# Patient Record
Sex: FEMALE | Race: WHITE | URBAN - METROPOLITAN AREA
[De-identification: names, ages, dates, MRNs, and addresses within clinical notes are randomized per-mention and may not be internally consistent; named-entity substitution may affect disease eponyms.]

---

## 2018-06-12 ENCOUNTER — OFFICE VISIT (OUTPATIENT)
Dept: OPHTHALMOLOGY | Facility: CLINIC | Age: 83
End: 2018-06-12
Payer: COMMERCIAL

## 2018-06-12 DIAGNOSIS — H43.813 POSTERIOR VITREOUS DETACHMENT OF BOTH EYES: ICD-10-CM

## 2018-06-12 DIAGNOSIS — H52.4 PRESBYOPIA: ICD-10-CM

## 2018-06-12 DIAGNOSIS — H25.813 COMBINED FORMS OF AGE-RELATED CATARACT OF BOTH EYES: Primary | ICD-10-CM

## 2018-06-12 PROCEDURE — 92014 COMPRE OPH EXAM EST PT 1/>: CPT | Performed by: OPHTHALMOLOGY

## 2018-06-12 PROCEDURE — 92015 DETERMINE REFRACTIVE STATE: CPT | Performed by: OPHTHALMOLOGY

## 2018-06-12 ASSESSMENT — CONF VISUAL FIELD
OS_NORMAL: 1
OD_NORMAL: 1

## 2018-06-12 ASSESSMENT — VISUAL ACUITY
OD_SC: 20/40
OD_CC: 2-
OS_CC: 2-
METHOD: SNELLEN - LINEAR
OS_SC: 20/40
OS_SC+: +2

## 2018-06-12 ASSESSMENT — REFRACTION_WEARINGRX
OS_SPHERE: +2.50
OD_CYLINDER: SPHERE
SPECS_TYPE: OTC
OD_SPHERE: +2.50
OS_CYLINDER: SPHERE

## 2018-06-12 ASSESSMENT — REFRACTION_MANIFEST
OD_AXIS: 012
OD_ADD: +3.00
OS_SPHERE: -0.50
OS_AXIS: 172
OS_ADD: +3.00
OD_SPHERE: -0.25
OS_CYLINDER: +0.75
OD_CYLINDER: +0.50

## 2018-06-12 ASSESSMENT — SLIT LAMP EXAM - LIDS: COMMENTS: 1-2+ DERMATOCHALASIS - UPPER LID

## 2018-06-12 ASSESSMENT — CUP TO DISC RATIO
OD_RATIO: 0.6
OS_RATIO: 0.4

## 2018-06-12 ASSESSMENT — EXTERNAL EXAM - RIGHT EYE: OD_EXAM: MILD BROW

## 2018-06-12 ASSESSMENT — TONOMETRY
IOP_METHOD: APPLANATION
OD_IOP_MMHG: 14
OS_IOP_MMHG: 14

## 2018-06-12 ASSESSMENT — EXTERNAL EXAM - LEFT EYE: OS_EXAM: MILD BROW

## 2018-06-12 NOTE — MR AVS SNAPSHOT
After Visit Summary   6/12/2018    Judy Talbert    MRN: [id number]           Patient Information     Date Of Birth          6/24/1930        Visit Information        Provider Department      6/12/2018 2:30 PM Zana Davis MD [facility]        Today's Diagnoses     Presbyopia    -  1    Combined forms of age-related cataract of both eyes        Posterior vitreous detachment of both eyes          Care Instructions    Glasses Rx given - optional.   Use artificial tears up to 4 times daily both eyes.  (Refresh Tears, Systane Ultra/Balance, or Theratears).  Call in February 2019 for an appointment in June 2019 for Complete Exam    Dr. Davis [phone]          Follow-ups after your visit        Who to contact     If you have questions or need follow up information about today's clinic visit or your schedule please contact [facility] directly at [phone].  Normal or non-critical lab and imaging results will be communicated to you by MyChart, letter or phone within 4 business days after the clinic has received the results. If you do not hear from us within 7 days, please contact the clinic through MyChart or phone. If you have a critical or abnormal lab result, we will notify you by phone as soon as possible.  Submit refill requests through [employer] or call your pharmacy and they will forward the refill request to us. Please allow 3 business days for your refill to be completed.          Additional Information About Your Visit        Care EveryWhere ID     This is your Care EveryWhere ID. This could be used by other organizations to access your [facility] medical records  [id number]         Blood Pressure from Last 3 Encounters:   No data found for BP    Weight from Last 3 Encounters:   No data found for Wt              Today, you had the following     No orders found for display       Primary Care Provider Office Phone # Fax #    Adithya Toscano MD  [phone]       [facility] [address]  [facility] [address]        Equal Access to Services     LIZZ NOLAN : Hadii christian hillman tanya Youssef, waangyda luqwai, olgata kadeepda sindhu, isha cabello. So [facility] [phone].    ATENCIÓN: Si habla español, tiene a lam disposición servicios gratuitos de asistencia lingüística. Llame al [phone].    We comply with applicable federal civil rights laws and [place] laws. We do not discriminate on the basis of race, color, national origin, age, disability, sex, sexual orientation, or gender identity.            Thank you!     Thank you for choosing [facility]  for your care. Our goal is always to provide you with excellent care. Hearing back from our patients is one way we can continue to improve our services. Please take a few minutes to complete the written survey that you may receive in the mail after your visit with us. Thank you!             Your Updated Medication List - Protect others around you: Learn how to safely use, store and throw away your medicines at www.disposemymeds.org.          This list is accurate as of 6/12/18  3:48 PM.  Always use your most recent med list.                   Brand Name Dispense Instructions for use Diagnosis    AMIODARONE HCL PO      Take 200 mg by mouth daily        ASPIRIN PO      Take 81 mg by mouth        calcium-vitamin D 600-400 MG-UNIT per tablet    CALTRATE     Take 1 tablet by mouth 2 times daily        MECLIZINE HCL PO           METOPROLOL SUCCINATE ER PO      Take 50 mg by mouth daily        WARFARIN SODIUM PO

## 2018-06-12 NOTE — PATIENT INSTRUCTIONS
Glasses Rx given - optional.   Use artificial tears up to 4 times daily both eyes.  (Refresh Tears, Systane Ultra/Balance, or Theratears).  Call in February 2019 for an appointment in June 2019 for Complete Exam    Dr. Davis [phone]

## 2018-06-12 NOTE — PROGRESS NOTES
Current Eye Medications:  None.       Subjective:  Comprehensive Eye Exam.  No vision changes or concerns - she wears over-the-counter readers as needed, but otherwise does well without correction.     Objective:  See Ophthalmology Exam.       Assessment:  Stable eye exam.      ICD-10-CM    1. Combined forms of age-related cataract, mild-mod, of both eyes H25.813 EYE EXAM (SIMPLE-NONBILLABLE)   2. Posterior vitreous detachment of both eyes H43.813    3. Presbyopia H52.4 REFRACTIVE STATUS        Plan:   Glasses Rx given - optional.   Use artificial tears up to 4 times daily both eyes.  (Refresh Tears, Systane Ultra/Balance, or Theratears).  Call in February 2019 for an appointment in June 2019 for Complete Exam    Dr. Davis [phone]

## 2018-06-17 PROBLEM — H25.813 COMBINED FORMS OF AGE-RELATED CATARACT OF BOTH EYES: Status: ACTIVE | Noted: 2018-06-17

## 2018-06-17 PROBLEM — H43.813 POSTERIOR VITREOUS DETACHMENT OF BOTH EYES: Status: ACTIVE | Noted: 2018-06-17

## 2019-06-24 ENCOUNTER — OFFICE VISIT (OUTPATIENT)
Dept: OPHTHALMOLOGY | Facility: CLINIC | Age: 84
End: 2019-06-24
Payer: COMMERCIAL

## 2019-06-24 DIAGNOSIS — H25.813 COMBINED FORMS OF AGE-RELATED CATARACT OF BOTH EYES: Primary | ICD-10-CM

## 2019-06-24 DIAGNOSIS — H43.813 POSTERIOR VITREOUS DETACHMENT OF BOTH EYES: ICD-10-CM

## 2019-06-24 PROCEDURE — 92014 COMPRE OPH EXAM EST PT 1/>: CPT | Performed by: OPHTHALMOLOGY

## 2019-06-24 ASSESSMENT — SLIT LAMP EXAM - LIDS: COMMENTS: 1-2+ DERMATOCHALASIS - UPPER LID

## 2019-06-24 ASSESSMENT — REFRACTION_WEARINGRX
OS_SPHERE: +2.50
SPECS_TYPE: OTC READERS
OD_SPHERE: +2.50
OD_CYLINDER: SPHERE
OS_CYLINDER: SPHERE

## 2019-06-24 ASSESSMENT — VISUAL ACUITY
OS_SC+: -1+1
OD_SC+: -2
OD_SC: 20/25
METHOD: SNELLEN - LINEAR
OS_SC: 20/30

## 2019-06-24 ASSESSMENT — REFRACTION_MANIFEST
OD_ADD: +3.00
OD_CYLINDER: +0.25
OS_CYLINDER: +1.00
OS_ADD: +3.00
OS_SPHERE: +0.25
OS_AXIS: 005
OD_AXIS: 010
OD_SPHERE: +0.25

## 2019-06-24 ASSESSMENT — TONOMETRY
OD_IOP_MMHG: 21
OS_IOP_MMHG: 21
IOP_METHOD: APPLANATION

## 2019-06-24 ASSESSMENT — CUP TO DISC RATIO
OS_RATIO: 0.4
OD_RATIO: 0.6

## 2019-06-24 ASSESSMENT — CONF VISUAL FIELD
OD_NORMAL: 1
OS_NORMAL: 1

## 2019-06-24 ASSESSMENT — EXTERNAL EXAM - LEFT EYE: OS_EXAM: MILD BROW

## 2019-06-24 ASSESSMENT — EXTERNAL EXAM - RIGHT EYE: OD_EXAM: MILD BROW

## 2019-06-24 NOTE — PROGRESS NOTES
Current Eye Medications:  None     Subjective: here for complete today. Only uses readers, vision has been very good. Has been dealing with Vertigo, has done physical therapy.  Also the therapist told her that she could tell she had low blood pressure that day, about a week an a half ago.  Seems unsteady today   Today [birth year] her birthday!  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      Objective:  See Ophthalmology Exam.       Assessment:  Stable mild-moderate cataract both eyes.      ICD-10-CM    1. Combined forms of age-related cataract, mild-mod, of both eyes H25.813 EYE EXAM (SIMPLE-NONBILLABLE)   2. Posterior vitreous detachment of both eyes H43.813 CANCELED: REFRACTIVE STATUS        Plan:  Continue over the counter reader (+3.00)  Call in February 2020 for an appointment in June 2020 for Complete Exam.    Dr. Davis [phone]

## 2019-06-24 NOTE — LETTER
6/24/2019         RE: Judy Talbert  [address] [id number]        Dear Colleague,    Thank you for referring your patient, Judy Talbert, to the [facility]. Please see a copy of my visit note below.     Current Eye Medications:  None     Subjective: here for complete today. Only uses readers, vision has been very good. Has been dealing with Vertigo, has done physical therapy.  Also the therapist told her that she could tell she had low blood pressure that day, about a week an a half ago.  Seems unsteady today   Today is her birthday!  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      Objective:  See Ophthalmology Exam.       Assessment:  Stable mild-moderate cataract both eyes.      ICD-10-CM    1. Combined forms of age-related cataract, mild-mod, of both eyes H25.813 EYE EXAM (SIMPLE-NONBILLABLE)   2. Posterior vitreous detachment of both eyes H43.813 CANCELED: REFRACTIVE STATUS        Plan:  Continue over the counter reader (+3.00)  Call in February 2020 for an appointment in June 2020 for Complete Exam.    Dr. Davis [phone]       Again, thank you for allowing me to participate in the care of your patient.        Sincerely,        Zana Davis MD

## 2019-06-24 NOTE — PATIENT INSTRUCTIONS
Continue over the counter reader (+3.00)  Call in February 2020 for an appointment in June 2020 for Complete Exam.    Dr. Davis [phone]

## 2022-01-01 ENCOUNTER — LAB REQUISITION (OUTPATIENT)
Dept: LAB | Facility: CLINIC | Age: 87
End: 2022-01-01
Payer: COMMERCIAL

## 2022-01-01 DIAGNOSIS — J18.9 PNEUMONIA, UNSPECIFIED ORGANISM: ICD-10-CM

## 2022-01-01 DIAGNOSIS — I48.0 PAROXYSMAL ATRIAL FIBRILLATION (H): ICD-10-CM

## 2022-01-01 DIAGNOSIS — D64.9 ANEMIA, UNSPECIFIED: ICD-10-CM

## 2022-01-01 DIAGNOSIS — Z51.81 ENCOUNTER FOR THERAPEUTIC DRUG LEVEL MONITORING: ICD-10-CM

## 2022-01-01 DIAGNOSIS — I48.91 UNSPECIFIED ATRIAL FIBRILLATION (H): ICD-10-CM

## 2022-01-01 DIAGNOSIS — D69.6 THROMBOCYTOPENIA, UNSPECIFIED (H): ICD-10-CM

## 2022-01-01 DIAGNOSIS — I82.401 ACUTE EMBOLISM AND THROMBOSIS OF UNSPECIFIED DEEP VEINS OF RIGHT LOWER EXTREMITY (H): ICD-10-CM

## 2022-01-01 DIAGNOSIS — D72.829 ELEVATED WHITE BLOOD CELL COUNT, UNSPECIFIED: ICD-10-CM

## 2022-01-01 LAB
AST SERPL W P-5'-P-CCNC: 29 U/L (ref 10–35)
BASOPHILS # BLD AUTO: 0 10E3/UL (ref 0–0.2)
BASOPHILS # BLD AUTO: 0 10E3/UL (ref 0–0.2)
BASOPHILS # BLD AUTO: 0.1 10E3/UL (ref 0–0.2)
BASOPHILS NFR BLD AUTO: 0 %
BASOPHILS NFR BLD AUTO: 1 %
BASOPHILS NFR BLD AUTO: 1 %
EOSINOPHIL # BLD AUTO: 0 10E3/UL (ref 0–0.7)
EOSINOPHIL # BLD AUTO: 0.1 10E3/UL (ref 0–0.7)
EOSINOPHIL # BLD AUTO: 0.1 10E3/UL (ref 0–0.7)
EOSINOPHIL NFR BLD AUTO: 1 %
EOSINOPHIL NFR BLD AUTO: 1 %
EOSINOPHIL NFR BLD AUTO: 2 %
ERYTHROCYTE [DISTWIDTH] IN BLOOD BY AUTOMATED COUNT: 15.9 % (ref 10–15)
ERYTHROCYTE [DISTWIDTH] IN BLOOD BY AUTOMATED COUNT: 16 % (ref 10–15)
ERYTHROCYTE [DISTWIDTH] IN BLOOD BY AUTOMATED COUNT: 17 % (ref 10–15)
ERYTHROCYTE [DISTWIDTH] IN BLOOD BY AUTOMATED COUNT: 17.6 % (ref 10–15)
HCT VFR BLD AUTO: 29.8 % (ref 35–47)
HCT VFR BLD AUTO: 31.5 % (ref 35–47)
HCT VFR BLD AUTO: 31.7 % (ref 35–47)
HCT VFR BLD AUTO: 32.7 % (ref 35–47)
HGB BLD-MCNC: 10 G/DL (ref 11.7–15.7)
HGB BLD-MCNC: 10 G/DL (ref 11.7–15.7)
HGB BLD-MCNC: 10.3 G/DL (ref 11.7–15.7)
HGB BLD-MCNC: 9.4 G/DL (ref 11.7–15.7)
IMM GRANULOCYTES # BLD: 0 10E3/UL
IMM GRANULOCYTES # BLD: 0.1 10E3/UL
IMM GRANULOCYTES # BLD: 0.1 10E3/UL
IMM GRANULOCYTES NFR BLD: 1 %
INR PPP: 1.28 (ref 0.85–1.15)
INR PPP: 1.4 (ref 0.85–1.15)
INR PPP: 1.41 (ref 0.85–1.15)
INR PPP: 1.53 (ref 0.85–1.15)
INR PPP: 2.13 (ref 0.85–1.15)
INR PPP: 2.27 (ref 0.85–1.15)
INR PPP: 2.34 (ref 0.85–1.15)
INR PPP: 2.43 (ref 0.85–1.15)
INR PPP: 2.49 (ref 0.85–1.15)
INR PPP: 2.56 (ref 0.85–1.15)
INR PPP: 2.67 (ref 0.85–1.15)
INR PPP: 2.69 (ref 0.85–1.15)
INR PPP: 2.7 (ref 0.85–1.15)
INR PPP: 2.83 (ref 0.85–1.15)
INR PPP: 2.88 (ref 0.85–1.15)
INR PPP: 2.89 (ref 0.85–1.15)
INR PPP: 3.02 (ref 0.85–1.15)
INR PPP: 3.03 (ref 0.85–1.15)
INR PPP: 3.04 (ref 0.85–1.15)
INR PPP: 3.26 (ref 0.85–1.15)
INR PPP: 3.41 (ref 0.85–1.15)
INR PPP: 3.86 (ref 0.85–1.15)
INR PPP: 3.88 (ref 0.85–1.15)
INR PPP: 3.92 (ref 0.85–1.15)
INR PPP: 7.36 (ref 0.85–1.15)
LYMPHOCYTES # BLD AUTO: 1.2 10E3/UL (ref 0.8–5.3)
LYMPHOCYTES # BLD AUTO: 1.3 10E3/UL (ref 0.8–5.3)
LYMPHOCYTES # BLD AUTO: 1.6 10E3/UL (ref 0.8–5.3)
LYMPHOCYTES NFR BLD AUTO: 21 %
LYMPHOCYTES NFR BLD AUTO: 27 %
LYMPHOCYTES NFR BLD AUTO: 29 %
MCH RBC QN AUTO: 30.4 PG (ref 26.5–33)
MCH RBC QN AUTO: 30.7 PG (ref 26.5–33)
MCH RBC QN AUTO: 30.8 PG (ref 26.5–33)
MCH RBC QN AUTO: 31 PG (ref 26.5–33)
MCHC RBC AUTO-ENTMCNC: 31.5 G/DL (ref 31.5–36.5)
MCHC RBC AUTO-ENTMCNC: 31.7 G/DL (ref 31.5–36.5)
MCV RBC AUTO: 96 FL (ref 78–100)
MCV RBC AUTO: 98 FL (ref 78–100)
MONOCYTES # BLD AUTO: 0.8 10E3/UL (ref 0–1.3)
MONOCYTES # BLD AUTO: 0.9 10E3/UL (ref 0–1.3)
MONOCYTES # BLD AUTO: 1 10E3/UL (ref 0–1.3)
MONOCYTES NFR BLD AUTO: 16 %
MONOCYTES NFR BLD AUTO: 18 %
MONOCYTES NFR BLD AUTO: 18 %
NEUTROPHILS # BLD AUTO: 2.6 10E3/UL (ref 1.6–8.3)
NEUTROPHILS # BLD AUTO: 2.9 10E3/UL (ref 1.6–8.3)
NEUTROPHILS # BLD AUTO: 3.3 10E3/UL (ref 1.6–8.3)
NEUTROPHILS NFR BLD AUTO: 51 %
NEUTROPHILS NFR BLD AUTO: 53 %
NEUTROPHILS NFR BLD AUTO: 58 %
NRBC # BLD AUTO: 0 10E3/UL
NRBC BLD AUTO-RTO: 0 /100
PLATELET # BLD AUTO: 106 10E3/UL (ref 150–450)
PLATELET # BLD AUTO: 117 10E3/UL (ref 150–450)
PLATELET # BLD AUTO: 120 10E3/UL (ref 150–450)
PLATELET # BLD AUTO: 92 10E3/UL (ref 150–450)
PLATELET # BLD AUTO: 96 10E3/UL (ref 150–450)
RBC # BLD AUTO: 3.09 10E6/UL (ref 3.8–5.2)
RBC # BLD AUTO: 3.23 10E6/UL (ref 3.8–5.2)
RBC # BLD AUTO: 3.25 10E6/UL (ref 3.8–5.2)
RBC # BLD AUTO: 3.35 10E6/UL (ref 3.8–5.2)
T4 FREE SERPL-MCNC: 1.62 NG/DL (ref 0.9–1.7)
TSH SERPL DL<=0.005 MIU/L-ACNC: 4.91 UIU/ML (ref 0.3–4.2)
WBC # BLD AUTO: 11.9 10E3/UL (ref 4–11)
WBC # BLD AUTO: 4.8 10E3/UL (ref 4–11)
WBC # BLD AUTO: 5.6 10E3/UL (ref 4–11)
WBC # BLD AUTO: 5.6 10E3/UL (ref 4–11)
WBC # BLD AUTO: 5.7 10E3/UL (ref 4–11)

## 2022-01-01 PROCEDURE — 36415 COLL VENOUS BLD VENIPUNCTURE: CPT | Mod: ORL | Performed by: NURSE PRACTITIONER

## 2022-01-01 PROCEDURE — P9604 ONE-WAY ALLOW PRORATED TRIP: HCPCS | Mod: ORL | Performed by: NURSE PRACTITIONER

## 2022-01-01 PROCEDURE — 85610 PROTHROMBIN TIME: CPT | Mod: ORL | Performed by: NURSE PRACTITIONER

## 2022-01-01 PROCEDURE — P9603 ONE-WAY ALLOW PRORATED MILES: HCPCS | Mod: ORL | Performed by: NURSE PRACTITIONER

## 2022-01-01 PROCEDURE — 85048 AUTOMATED LEUKOCYTE COUNT: CPT | Mod: ORL | Performed by: NURSE PRACTITIONER

## 2022-01-01 PROCEDURE — 85025 COMPLETE CBC W/AUTO DIFF WBC: CPT | Mod: ORL

## 2022-01-01 PROCEDURE — 84450 TRANSFERASE (AST) (SGOT): CPT | Mod: ORL | Performed by: NURSE PRACTITIONER

## 2022-01-01 PROCEDURE — 85025 COMPLETE CBC W/AUTO DIFF WBC: CPT | Mod: ORL | Performed by: NURSE PRACTITIONER

## 2022-01-01 PROCEDURE — P9604 ONE-WAY ALLOW PRORATED TRIP: HCPCS | Mod: ORL

## 2022-01-01 PROCEDURE — 85027 COMPLETE CBC AUTOMATED: CPT | Mod: ORL | Performed by: NURSE PRACTITIONER

## 2022-01-01 PROCEDURE — 85025 COMPLETE CBC W/AUTO DIFF WBC: CPT | Mod: ORL | Performed by: INTERNAL MEDICINE

## 2022-01-01 PROCEDURE — 84443 ASSAY THYROID STIM HORMONE: CPT | Mod: ORL | Performed by: NURSE PRACTITIONER

## 2022-01-01 PROCEDURE — 85014 HEMATOCRIT: CPT | Mod: ORL | Performed by: NURSE PRACTITIONER

## 2022-01-01 PROCEDURE — 85049 AUTOMATED PLATELET COUNT: CPT | Mod: ORL | Performed by: NURSE PRACTITIONER

## 2022-01-01 PROCEDURE — 84439 ASSAY OF FREE THYROXINE: CPT | Mod: ORL | Performed by: NURSE PRACTITIONER

## 2023-01-01 ENCOUNTER — LAB REQUISITION (OUTPATIENT)
Dept: LAB | Facility: CLINIC | Age: 88
End: 2023-01-01
Payer: COMMERCIAL

## 2023-01-01 DIAGNOSIS — I48.20 CHRONIC ATRIAL FIBRILLATION, UNSPECIFIED (H): ICD-10-CM

## 2023-01-01 DIAGNOSIS — Z51.81 ENCOUNTER FOR THERAPEUTIC DRUG LEVEL MONITORING: ICD-10-CM

## 2023-01-01 DIAGNOSIS — I48.0 PAROXYSMAL ATRIAL FIBRILLATION (H): ICD-10-CM

## 2023-01-01 DIAGNOSIS — I48.21 PERMANENT ATRIAL FIBRILLATION (H): ICD-10-CM

## 2023-01-01 DIAGNOSIS — I82.401 ACUTE EMBOLISM AND THROMBOSIS OF UNSPECIFIED DEEP VEINS OF RIGHT LOWER EXTREMITY (H): ICD-10-CM

## 2023-01-01 DIAGNOSIS — R35.0 FREQUENCY OF MICTURITION: ICD-10-CM

## 2023-01-01 DIAGNOSIS — K59.1 FUNCTIONAL DIARRHEA: ICD-10-CM

## 2023-01-01 LAB
ALBUMIN UR-MCNC: NEGATIVE MG/DL
APPEARANCE UR: CLEAR
BACTERIA UR CULT: NO GROWTH
BILIRUB UR QL STRIP: NEGATIVE
C COLI+JEJUNI+LARI FUSA STL QL NAA+PROBE: NOT DETECTED
C DIFF TOX B STL QL: NEGATIVE
COLOR UR AUTO: ABNORMAL
EC STX1 GENE STL QL NAA+PROBE: NOT DETECTED
EC STX2 GENE STL QL NAA+PROBE: NOT DETECTED
GLUCOSE UR STRIP-MCNC: NEGATIVE MG/DL
HGB UR QL STRIP: NEGATIVE
INR PPP: 1.92 (ref 0.85–1.15)
INR PPP: 2.1 (ref 0.85–1.15)
INR PPP: 2.35 (ref 0.85–1.15)
INR PPP: 2.49 (ref 0.85–1.15)
INR PPP: 2.62 (ref 0.85–1.15)
INR PPP: 3.22 (ref 0.85–1.15)
INR PPP: 3.22 (ref 0.85–1.15)
KETONES UR STRIP-MCNC: NEGATIVE MG/DL
LEUKOCYTE ESTERASE UR QL STRIP: NEGATIVE
MUCOUS THREADS #/AREA URNS LPF: PRESENT /LPF
NITRATE UR QL: NEGATIVE
NOROV GI+II ORF1-ORF2 JNC STL QL NAA+PR: DETECTED
PH UR STRIP: 7 [PH] (ref 5–7)
RBC URINE: 0 /HPF
RVA NSP5 STL QL NAA+PROBE: NOT DETECTED
SALMONELLA SP RPOD STL QL NAA+PROBE: NOT DETECTED
SHIGELLA SP+EIEC IPAH STL QL NAA+PROBE: NOT DETECTED
SP GR UR STRIP: 1.01 (ref 1–1.03)
T4 FREE SERPL-MCNC: 1.68 NG/DL (ref 0.9–1.7)
TRANSITIONAL EPI: <1 /HPF
TSH SERPL DL<=0.005 MIU/L-ACNC: 6.87 UIU/ML (ref 0.3–4.2)
UROBILINOGEN UR STRIP-MCNC: NORMAL MG/DL
V CHOL+PARA RFBL+TRKH+TNAA STL QL NAA+PR: NOT DETECTED
WBC URINE: <1 /HPF
Y ENTERO RECN STL QL NAA+PROBE: NOT DETECTED

## 2023-01-01 PROCEDURE — 36415 COLL VENOUS BLD VENIPUNCTURE: CPT | Mod: ORL | Performed by: NURSE PRACTITIONER

## 2023-01-01 PROCEDURE — 85610 PROTHROMBIN TIME: CPT | Mod: ORL | Performed by: NURSE PRACTITIONER

## 2023-01-01 PROCEDURE — P9604 ONE-WAY ALLOW PRORATED TRIP: HCPCS | Mod: ORL | Performed by: NURSE PRACTITIONER

## 2023-01-01 PROCEDURE — 87086 URINE CULTURE/COLONY COUNT: CPT | Mod: ORL | Performed by: NURSE PRACTITIONER

## 2023-01-01 PROCEDURE — 87506 IADNA-DNA/RNA PROBE TQ 6-11: CPT | Mod: ORL | Performed by: NURSE PRACTITIONER

## 2023-01-01 PROCEDURE — 87493 C DIFF AMPLIFIED PROBE: CPT | Mod: ORL,XU | Performed by: NURSE PRACTITIONER

## 2023-01-01 PROCEDURE — P9603 ONE-WAY ALLOW PRORATED MILES: HCPCS | Mod: ORL | Performed by: NURSE PRACTITIONER

## 2023-01-01 PROCEDURE — 84443 ASSAY THYROID STIM HORMONE: CPT | Mod: ORL | Performed by: NURSE PRACTITIONER

## 2023-01-01 PROCEDURE — 84439 ASSAY OF FREE THYROXINE: CPT | Mod: ORL | Performed by: NURSE PRACTITIONER

## 2023-01-01 PROCEDURE — 81001 URINALYSIS AUTO W/SCOPE: CPT | Mod: ORL | Performed by: NURSE PRACTITIONER
